# Patient Record
Sex: FEMALE | Race: BLACK OR AFRICAN AMERICAN | Employment: FULL TIME | URBAN - METROPOLITAN AREA
[De-identification: names, ages, dates, MRNs, and addresses within clinical notes are randomized per-mention and may not be internally consistent; named-entity substitution may affect disease eponyms.]

---

## 2021-08-29 ENCOUNTER — APPOINTMENT (OUTPATIENT)
Dept: GENERAL RADIOLOGY | Age: 36
End: 2021-08-29
Payer: COMMERCIAL

## 2021-08-29 ENCOUNTER — HOSPITAL ENCOUNTER (EMERGENCY)
Age: 36
Discharge: HOME | End: 2021-08-29
Attending: EMERGENCY MEDICINE
Payer: COMMERCIAL

## 2021-08-29 VITALS
RESPIRATION RATE: 18 BRPM | TEMPERATURE: 98.3 F | HEART RATE: 66 BPM | DIASTOLIC BLOOD PRESSURE: 89 MMHG | SYSTOLIC BLOOD PRESSURE: 132 MMHG | OXYGEN SATURATION: 99 %

## 2021-08-29 DIAGNOSIS — M25.512 ACUTE PAIN OF LEFT SHOULDER: ICD-10-CM

## 2021-08-29 DIAGNOSIS — R07.89 PRESSURE IN LEFT SIDE OF CHEST: Primary | ICD-10-CM

## 2021-08-29 LAB
ANION GAP SERPL CALCULATED.3IONS-SCNC: 11 MMOL/L (ref 3–16)
BASOPHILS ABSOLUTE: 0.1 K/UL (ref 0–0.2)
BASOPHILS RELATIVE PERCENT: 1 %
BUN BLDV-MCNC: 11 MG/DL (ref 7–20)
CALCIUM SERPL-MCNC: 9 MG/DL (ref 8.3–10.6)
CHLORIDE BLD-SCNC: 104 MMOL/L (ref 99–110)
CO2: 24 MMOL/L (ref 21–32)
CREAT SERPL-MCNC: 0.8 MG/DL (ref 0.6–1.1)
EOSINOPHILS ABSOLUTE: 0.3 K/UL (ref 0–0.6)
EOSINOPHILS RELATIVE PERCENT: 4.4 %
GFR AFRICAN AMERICAN: >60
GFR NON-AFRICAN AMERICAN: >60
GLUCOSE BLD-MCNC: 100 MG/DL (ref 70–99)
HCT VFR BLD CALC: 39.8 % (ref 36–48)
HEMOGLOBIN: 13.5 G/DL (ref 12–16)
LYMPHOCYTES ABSOLUTE: 2.2 K/UL (ref 1–5.1)
LYMPHOCYTES RELATIVE PERCENT: 34.6 %
MCH RBC QN AUTO: 30.4 PG (ref 26–34)
MCHC RBC AUTO-ENTMCNC: 34 G/DL (ref 31–36)
MCV RBC AUTO: 89.4 FL (ref 80–100)
MONOCYTES ABSOLUTE: 0.5 K/UL (ref 0–1.3)
MONOCYTES RELATIVE PERCENT: 8.3 %
NEUTROPHILS ABSOLUTE: 3.3 K/UL (ref 1.7–7.7)
NEUTROPHILS RELATIVE PERCENT: 51.7 %
PDW BLD-RTO: 14.8 % (ref 12.4–15.4)
PLATELET # BLD: 274 K/UL (ref 135–450)
PMV BLD AUTO: 9 FL (ref 5–10.5)
POTASSIUM REFLEX MAGNESIUM: 4 MMOL/L (ref 3.5–5.1)
RBC # BLD: 4.46 M/UL (ref 4–5.2)
SODIUM BLD-SCNC: 139 MMOL/L (ref 136–145)
TROPONIN: <0.01 NG/ML
WBC # BLD: 6.3 K/UL (ref 4–11)

## 2021-08-29 PROCEDURE — 99284 EMERGENCY DEPT VISIT MOD MDM: CPT

## 2021-08-29 PROCEDURE — 80048 BASIC METABOLIC PNL TOTAL CA: CPT

## 2021-08-29 PROCEDURE — 71046 X-RAY EXAM CHEST 2 VIEWS: CPT

## 2021-08-29 PROCEDURE — 84484 ASSAY OF TROPONIN QUANT: CPT

## 2021-08-29 PROCEDURE — 85025 COMPLETE CBC W/AUTO DIFF WBC: CPT

## 2021-08-29 PROCEDURE — 93005 ELECTROCARDIOGRAM TRACING: CPT | Performed by: EMERGENCY MEDICINE

## 2021-08-29 RX ORDER — AMLODIPINE BESYLATE 5 MG/1
5 TABLET ORAL DAILY
COMMUNITY

## 2021-08-29 RX ORDER — LISINOPRIL 20 MG/1
20 TABLET ORAL DAILY
COMMUNITY

## 2021-08-29 ASSESSMENT — PAIN DESCRIPTION - DESCRIPTORS: DESCRIPTORS: PRESSURE

## 2021-08-29 ASSESSMENT — PAIN DESCRIPTION - PAIN TYPE: TYPE: ACUTE PAIN

## 2021-08-29 ASSESSMENT — PAIN SCALES - GENERAL
PAINLEVEL_OUTOF10: 5
PAINLEVEL_OUTOF10: 5

## 2021-08-29 ASSESSMENT — PAIN DESCRIPTION - LOCATION: LOCATION: CHEST

## 2021-08-29 ASSESSMENT — HEART SCORE: ECG: 1

## 2021-08-29 NOTE — ED NOTES
ED MD at bedside    PT here with CC of left sided chest pain that radiates down her left arm and into left shoulder that she describes as pressure, that increases as she breaths in deep. States that it started last night, states she got on google and \"freaked\" herself out.     The pain increases with movement   Denies any SOB     Karley Ochoa RN  08/29/21 0800

## 2021-08-29 NOTE — ED PROVIDER NOTES
CHIEF COMPLAINT  Chest Pain (left sided radiates down left arm, no SOB, does radiates towards her upper left shoulder. no nausea, started last night )      HISTORY OF PRESENT ILLNESS  Charly Armstrong is a 43 y.o. female who  has a past medical history of Hypertension and Post partum depression. presents to the ED complaining of left-sided shoulder pain and left-sided chest pressure which she states has been present since last night. States the left shoulder pain does get with range of motion of the left shoulder. Denies any falls or trauma. States that the pressure in her chest worse gets worse with movement of her left shoulder as well. Denies any previous cardiac history. No cough or sputum production. No shortness of breath. No nausea or vomiting. Patient states that she googled her symptoms and read that she may be having a heart attack and then became very anxious. Patient states that she was worried that she was going to have a heart attack and not have any help around including the \"shocking device\". Denies any recent travel, prolonged immobilization, unilateral leg pain or swelling, history of DVT/PE, or recent surgery. Does have history of hypertension and states she has been compliant with her antihypertensives. No other complaints, modifying factors or associated symptoms. Nursing notes reviewed.    Past Medical History:   Diagnosis Date    Hypertension     Post partum depression      Past Surgical History:   Procedure Laterality Date     SECTION       Denies any pertinent family history  Social History     Socioeconomic History    Marital status: Single     Spouse name: Not on file    Number of children: Not on file    Years of education: Not on file    Highest education level: Not on file   Occupational History    Not on file   Tobacco Use    Smoking status: Never Smoker    Smokeless tobacco: Never Used   Substance and Sexual Activity    Alcohol use: Not Currently    Drug use: Never    Sexual activity: Yes     Partners: Male   Other Topics Concern    Not on file   Social History Narrative    Not on file     Social Determinants of Health     Financial Resource Strain:     Difficulty of Paying Living Expenses:    Food Insecurity:     Worried About Running Out of Food in the Last Year:     [address] in the Last Year:    Transportation Needs:     Lack of Transportation (Medical):  Lack of Transportation (Non-Medical):    Physical Activity:     Days of Exercise per Week:     Minutes of Exercise per Session:    Stress:     Feeling of Stress :    Social Connections:     Frequency of Communication with Friends and Family:     Frequency of Social Gatherings with Friends and Family:     Attends [employer] Services:     Active Member of Clubs or Organizations:     Attends Club or Organization Meetings:     Marital Status:    Intimate Partner Violence:     Fear of Current or Ex-Partner:     Emotionally Abused:     Physically Abused:     Sexually Abused:      No current facility-administered medications for this encounter.      Current Outpatient Medications   Medication Sig Dispense Refill    lisinopril (PRINIVIL;ZESTRIL) 20 MG tablet Take 20 mg by mouth daily      amLODIPine (NORVASC) 5 MG tablet Take 5 mg by mouth daily       No Known Allergies      REVIEW OF SYSTEMS  10 systems reviewed, pertinent positives per HPI otherwise noted to be negative    PHYSICAL EXAM  /89   Pulse 66   Temp 98.3 °F (36.8 °C)   Resp 18   LMP 08/09/2021   SpO2 99%      CONSTITUTIONAL: AOx4, cooperative with exam, afebrile   HEAD: normocephalic, atraumatic   EYES: PERRL, EOMI, anicteric sclera   ENT: Moist mucous membranes, uvula midline   NECK: Supple, symmetric, trachea midline, full range of motion without pain   LUNGS: Bilateral breath sounds, CTAB, no rales/ronchi/wheezes   CARDIOVASCULAR: RRR, normal S1/S2, no m/r/g, 2+ pulses throughout   ABDOMEN: Soft, non-tender, non-distended, +BS   NEUROLOGIC:  MAEx4, 5/5 strength throughout; fine touch sensation intact throughout; GCS 15   MUSCULOSKELETAL: No clubbing, cyanosis or edema, no calf tenderness or lower extremity edema bilaterally   SKIN: No rash, pallor or wounds on exposed surfaces     Heart Score    Heart Score for chest pain patients  History: Slightly Suspicious  ECG: Non-Specifc repolarization disturbance/LBTB/PM  Patient Age: < 45 years  *Risk factors for Atherosclerotic disease: Hypertension  Risk Factors: 1 or 2 risk factors  Troponin: < 1X normal limit  Heart Score Total: 2    Score 0 - 3 = 2.5%  MACE over next 6 wks = Discharge home  Score 4 - 6 = 20.3%  MACE over next 6 wks = Obs admit  Score 7 - 10 = 72.7%  MACE over next 6 wks = Early invasive Rx      RADIOLOGY  X-RAYS:  I have reviewed radiologic plain film image(s). ALL OTHER NON-PLAIN FILM IMAGES SUCH AS CT, ULTRASOUND AND MRI HAVE BEEN READ BY THE RADIOLOGIST. XR CHEST (2 VW)   Final Result   No significant findings in the chest.                EKG INTERPRETATION  Normal sinus rhythm with a rate of 73, normal axis, , QRS 90, QTc 436, no ST elevations, nonspecific ST changes, moderate voltage criteria for LVH present, no prior EKG on file. PROCEDURES    ED COURSE/MDM  Muscle strain, muscle spasm, contusion, pleuritic chest pain, anxiety, ACS/MI  Patient seen and evaluated. History and physical as above. Nontoxic, afebrile. Patient presents complaining of left-sided chest pain and left shoulder pain. Patient has no previous cardiac history. Pain is reproducible to movement making it more likely musculoskeletal in nature. Will obtain routine labs, troponin, EKG and chest x-ray. ED Course as of Aug 29 0928   Rick Corrigan Aug 29, 2021   [id number] Patient reassessed. Patient states she is feeling slightly better. Updated patient on unremarkable blood work and negative troponin. Patient's chest x-ray unremarkable as well.   Patient's pain likely due to musculoskeletal pain as opposed to cardiac in nature. Discussed discharge with outpatient follow-up as well as using ibuprofen as needed for pain control. Patient agreeable. Return instruction provided. All questions answered prior to discharge. [DS]      ED Course User Index  [DS] Glendy Howard MD       I estimate there is LOW risk for PULMONARY EMBOLISM, ACUTE CORONARY SYNDROME, OR THORACIC AORTIC DISSECTION, thus I consider the discharge disposition reasonable. Chey Davison and I have discussed the diagnosis and risks, and we agree with discharging home to follow-up with their primary doctor. We also discussed returning to the Emergency Department immediately if new or worsening symptoms occur. We have discussed the symptoms which are most concerning (e.g., bloody sputum, fever, worsening pain or shortness of breath, vomiting) that necessitate immediate return. Patient was given scripts for the following medications. I counseled patient how to take these medications. New Prescriptions    No medications on file           CLINICAL IMPRESSION  1. Pressure in left side of chest    2. Acute pain of left shoulder        Blood pressure 132/89, pulse 66, temperature 98.3 °F (36.8 °C), resp. rate 18, last menstrual period 08/09/2021, SpO2 99 %. DISPOSITION  Patient was discharged to home in good condition. Clyde Chapin MD    Call today  For a follow up appointment. Disclaimer: All medical record entries made by [address] [place] [address] [address] [facility].       (Please note that this note was completed with a voice recognition program. Every attempt was made to edit the dictations, but inevitably there remain words that are mis-transcribed.)            Glendy Howard MD  08/29/21 2350

## 2021-08-30 LAB
EKG ATRIAL RATE: 73 BPM
EKG DIAGNOSIS: NORMAL
EKG P AXIS: 51 DEGREES
EKG P-R INTERVAL: 178 MS
EKG Q-T INTERVAL: 396 MS
EKG QRS DURATION: 90 MS
EKG QTC CALCULATION (BAZETT): 436 MS
EKG R AXIS: 6 DEGREES
EKG T AXIS: 6 DEGREES
EKG VENTRICULAR RATE: 73 BPM

## 2021-08-30 PROCEDURE — 93010 ELECTROCARDIOGRAM REPORT: CPT | Performed by: INTERNAL MEDICINE
